# Patient Record
Sex: MALE | Race: OTHER
[De-identification: names, ages, dates, MRNs, and addresses within clinical notes are randomized per-mention and may not be internally consistent; named-entity substitution may affect disease eponyms.]

---
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
## 2017-05-28 NOTE — PN
[facility] CIWA





- CIWA Score


Nausea/Vomitin-No Nausea/No Vomiting


Muscle Tremors: 4-Moderate,w/Arms Extend


Anxiety: 3


Agitation: 3


Paroxysmal Sweats: 3


Orientation: 0-Oriented


Tacttile Disturbances: 0-None


Auditory Disturbances: 0-None


Visual Disturbances: 0-None


Headache: 0-None Present


CIWA-Ar Total Score: 13





[facility] COWS





- Scale


Resting Pulse: 0= [place] 80 or Below


Sweatin=Flushed/Facial Moisture


Restless Observation: 1= Difficult to Sit Still


Pupil Size: 0= Normal to Room Light


Bone or Joint Aches: 2= Severe Diffuse Aches


Runny Nose/ Eye Tearin= Runny Nose/Eyes


GI Upset > 30mins: 2= Nausea/Diarrhea


Tremor Observation of Outstretched Hands: 2= Slight Tremor Visible


Yawning Observation: 1= 1-2x During Session


Anxiety or Irritability: 2=Irritable/Anxious


Goose Flesh Skin: 0=Smooth Skin


COWS Score: 14





[facility] Progress Note (SOAP)


Subjective: 


Anxiety,tremors,sweating,interrupted sleep,restless,muscle aches.


Objective: 





17 15:32


 Vital Signs - 8 hr











  17





  11:12 13:41


 


Temperature 98.2 F 98.1 F


 


Pulse Rate 81 70


 


Respiratory 20 18





Rate  


 


Blood Pressure 118/72 106/69








 Laboratory Last Values











Urine Color  Ltyellow   17  17:34    


 


Urine Appearance  Clear   17  17:34    


 


Urine pH  6.0  (5.0-8.0)   17  17:34    


 


Ur Specific Gravity  1.015  (1.005-1.025)   17  17:34    


 


Urine Protein  Negative  (NEGATIVE)   17  17:34    


 


Urine Glucose (UA)  Negative  (NEGATIVE)   17  17:34    


 


Urine Ketones  Negative  (NEGATIVE)   17  17:34    


 


Urine Blood  Negative  (NEGATIVE)   17  17:34    


 


Urine Nitrite  Negative  (NEGATIVE)   17  17:34    


 


Urine Bilirubin  Negative  (NEGATIVE)   17  17:34    


 


Urine Urobilinogen  Negative E.U./dl (0.2-1.0)   17  17:34    


 


Ur Leukocyte Esterase  Negative  (NEGATIVE)   17  17:34    








labs noted


Assessment: 





17 15:33


Withdrawal sx.


Plan: 


Continue detox

## 2017-05-29 NOTE — PN
S CIWA





- CIWA Score


Nausea/Vomitin-Mild Nausea/No Vomiting


Muscle Tremors: 5


Anxiety: 4-Mod. Anxious/Guarded


Agitation: 3


Paroxysmal Sweats: 1-Minimal Palms Moist


Orientation: 0-Oriented


Tacttile Disturbances: 3-Moderate Itch/Numb/Burn


Auditory Disturbances: 0-None


Visual Disturbances: 2-Mild Sensitivity


Headache: 0-None Present


CIWA-Ar Total Score: 19





[facility] COWS





- Scale


Resting Pulse: 0= [place] 80 or Below


Sweatin=Flushed/Facial Moisture


Restless Observation: 1= Difficult to Sit Still


Pupil Size: 0= Normal to Room Light


Bone or Joint Aches: 2= Severe Diffuse Aches


Runny Nose/ Eye Tearin= Runny Nose/Eyes


GI Upset > 30mins: 1= Stomach Cramp


Tremor Observation of Outstretched Hands: 2= Slight Tremor Visible


Yawning Observation: 2= >3x During Session


Anxiety or Irritability: 2=Irritable/Anxious


Goose Flesh Skin: 0=Smooth Skin


COWS Score: 14





[facility] Progress Note (SOAP)


Subjective: 


Tremors, Anxious, Sweating.


Objective: 


PT. A & O X 3, OBSERVED AMBULATING ON UNIT. NO ACUTE DISTRESS.





17 15:12


 Vital Signs











Temperature  97.3 F L  17 10:12


 


Pulse Rate  76   17 10:12


 


Respiratory Rate  16   17 10:12


 


Blood Pressure  127/87   17 10:12


 


O2 Sat by Pulse Oximetry (%)      








 Laboratory Tests











  17





  17:34


 


Urine Color  Ltyellow


 


Urine Appearance  Clear


 


Urine pH  6.0


 


Ur Specific Gravity  1.015


 


Urine Protein  Negative


 


Urine Glucose (UA)  Negative


 


Urine Ketones  Negative


 


Urine Blood  Negative


 


Urine Nitrite  Negative


 


Urine Bilirubin  Negative


 


Urine Urobilinogen  Negative


 


Ur Leukocyte Esterase  Negative








LABS NOTED.





17 15:40





Assessment: 





17 15:12


WITHDRAWAL SYMPTOMS.


Plan: 


CONTINUE DETOX.


RE-ORDER ADMISSION LABS (PATIENT REFUSED INITIAL ATTEMPT AT LAB DRAW).

## 2017-05-29 NOTE — EKG
Test Reason : 

Blood Pressure : ***/*** mmHG

Vent. Rate : 066 BPM     Atrial Rate : 066 BPM

   P-R Int : 176 ms          QRS Dur : 112 ms

    QT Int : 442 ms       P-R-T Axes : 016 006 017 degrees

   QTc Int : 463 ms

 

NORMAL SINUS RHYTHM

INCOMPLETE RIGHT BUNDLE BRANCH BLOCK

MINIMAL VOLTAGE CRITERIA FOR LVH, MAY BE NORMAL VARIANT

BORDERLINE ECG

NO PREVIOUS ECGS AVAILABLE

Confirmed by BROOKS BRAVO MD ([id number]) on 5/29/2017 11:25:42 AM

 

Referred By:             Confirmed By:BROOKS BRAVO MD
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
## 2017-05-30 NOTE — PN
[facility] Progress Note (SOAP)


Subjective: 


INTERRUPTED SLEEP, SWEATS, WANTS TO WEAR OWN SNEAKERS AND TO SEE DIETICAN 


Objective: 





05/30/17 10:36


 Vital Signs











Temperature  97.9 F   05/30/17 09:40


 


Pulse Rate  82   05/30/17 09:40


 


Respiratory Rate  18   05/30/17 09:40


 


Blood Pressure  118/72   05/30/17 09:40


 


O2 Sat by Pulse Oximetry (%)      








 Laboratory Tests











  05/27/17





  17:34


 


Urine Color  Ltyellow


 


Urine Appearance  Clear


 


Urine pH  6.0


 


Ur Specific Gravity  1.015


 


Urine Protein  Negative


 


Urine Glucose (UA)  Negative


 


Urine Ketones  Negative


 


Urine Blood  Negative


 


Urine Nitrite  Negative


 


Urine Bilirubin  Negative


 


Urine Urobilinogen  Negative


 


Ur Leukocyte Esterase  Negative








PENDING LABS 





PT AOX3 IN  NAD AMBULATING 


Assessment: 





05/30/17 10:36


WIYTHDRAWAL SX;S 


Plan: 


CONT. DETOX 


INCREASE FLUIDS 


DIETICAN EVAL. 


MAY USE OWN SNEAKERS

## 2021-11-01 ENCOUNTER — HOSPITAL ENCOUNTER (INPATIENT)
Dept: HOSPITAL 74 - YASAS | Age: 46
LOS: 4 days | Discharge: OTHER INSTITUTION | DRG: 773 | End: 2021-11-05
Attending: ALLERGY & IMMUNOLOGY | Admitting: ALLERGY & IMMUNOLOGY
Payer: COMMERCIAL

## 2021-11-01 VITALS — BODY MASS INDEX: 37.5 KG/M2

## 2021-11-01 DIAGNOSIS — F41.9: ICD-10-CM
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
## 2021-11-01 DIAGNOSIS — F13.20: ICD-10-CM

## 2021-11-01 DIAGNOSIS — Z87.891: ICD-10-CM
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
## 2021-11-01 DIAGNOSIS — F11.20: ICD-10-CM

## 2021-11-01 DIAGNOSIS — F10.230: Primary | ICD-10-CM

## 2021-11-01 DIAGNOSIS — J45.909: ICD-10-CM

## 2021-11-01 PROCEDURE — U0003 INFECTIOUS AGENT DETECTION BY NUCLEIC ACID (DNA OR RNA); SEVERE ACUTE RESPIRATORY SYNDROME CORONAVIRUS 2 (SARS-COV-2) (CORONAVIRUS DISEASE [COVID-19]), AMPLIFIED PROBE TECHNIQUE, MAKING USE OF HIGH THROUGHPUT TECHNOLOGIES AS DESCRIBED BY CMS-2020-01-R: HCPCS

## 2021-11-01 PROCEDURE — U0005 INFEC AGEN DETEC AMPLI PROBE: HCPCS

## 2021-11-01 PROCEDURE — C9803 HOPD COVID-19 SPEC COLLECT: HCPCS

## 2021-11-02 LAB
ALBUMIN SERPL-MCNC: 3.4 G/DL (ref 3.4–5)
ALP SERPL-CCNC: 106 U/L (ref 45–117)
ALT SERPL-CCNC: 27 U/L (ref 13–61)
ANION GAP SERPL CALC-SCNC: 8 MMOL/L (ref 8–16)
AST SERPL-CCNC: 21 U/L (ref 15–37)
BILIRUB SERPL-MCNC: 0.5 MG/DL (ref 0.2–1)
BUN SERPL-MCNC: 21.3 MG/DL (ref 7–18)
CALCIUM SERPL-MCNC: 8.9 MG/DL (ref 8.5–10.1)
CHLORIDE SERPL-SCNC: 103 MMOL/L (ref 98–107)
CO2 SERPL-SCNC: 28 MMOL/L (ref 21–32)
CREAT SERPL-MCNC: 1.1 MG/DL (ref 0.55–1.3)
DEPRECATED RDW RBC AUTO: 14.4 % (ref 11.9–15.9)
GLUCOSE SERPL-MCNC: 96 MG/DL (ref 74–106)
HCT VFR BLD CALC: 40.3 % (ref 35.4–49)
HGB BLD-MCNC: 13.7 GM/DL (ref 11.7–16.9)
MCH RBC QN AUTO: 28.6 PG (ref 25.7–33.7)
MCHC RBC AUTO-ENTMCNC: 34 G/DL (ref 32–35.9)
MCV RBC: 84 FL (ref 80–96)
PLATELET # BLD AUTO: 380 10^3/UL (ref 134–434)
PMV BLD: 8.5 FL (ref 7.5–11.1)
PROT SERPL-MCNC: 7.8 G/DL (ref 6.4–8.2)
RBC # BLD AUTO: 4.8 M/MM3 (ref 4–5.6)
SODIUM SERPL-SCNC: 139 MMOL/L (ref 136–145)
WBC # BLD AUTO: 10.8 K/MM3 (ref 4–10)

## 2021-11-02 PROCEDURE — HZ2ZZZZ DETOXIFICATION SERVICES FOR SUBSTANCE ABUSE TREATMENT: ICD-10-PCS | Performed by: ALLERGY & IMMUNOLOGY

## 2021-11-02 RX ADMIN — NICOTINE POLACRILEX PRN MG: 4 GUM, CHEWING ORAL at 09:11

## 2021-11-02 RX ADMIN — Medication SCH: at 22:12

## 2021-11-02 RX ADMIN — NICOTINE POLACRILEX PRN MG: 4 GUM, CHEWING ORAL at 07:02

## 2021-11-02 RX ADMIN — NICOTINE POLACRILEX PRN MG: 4 GUM, CHEWING ORAL at 02:38

## 2021-11-02 RX ADMIN — NICOTINE POLACRILEX PRN MG: 4 GUM, CHEWING ORAL at 23:30

## 2021-11-02 RX ADMIN — METHOCARBAMOL PRN MG: 500 TABLET ORAL at 10:44

## 2021-11-02 RX ADMIN — Medication SCH MG: at 22:12

## 2021-11-02 RX ADMIN — NICOTINE POLACRILEX PRN MG: 4 GUM, CHEWING ORAL at 11:33

## 2021-11-02 RX ADMIN — NICOTINE POLACRILEX PRN MG: 4 GUM, CHEWING ORAL at 17:29

## 2021-11-02 RX ADMIN — Medication SCH TAB: at 10:44

## 2021-11-02 RX ADMIN — NICOTINE POLACRILEX PRN MG: 4 GUM, CHEWING ORAL at 21:03

## 2021-11-02 RX ADMIN — METHOCARBAMOL PRN MG: 500 TABLET ORAL at 22:12

## 2021-11-03 LAB
APPEARANCE UR: CLEAR
BILIRUB UR STRIP.AUTO-MCNC: NEGATIVE MG/DL
COLOR UR: YELLOW
KETONES UR QL STRIP: NEGATIVE
LEUKOCYTE ESTERASE UR QL STRIP.AUTO: NEGATIVE
NITRITE UR QL STRIP: NEGATIVE
PH UR: 7 [PH] (ref 5–8)
PROT UR QL STRIP: NEGATIVE
PROT UR QL STRIP: NEGATIVE
SP GR UR: 1.02 (ref 1.01–1.03)
UROBILINOGEN UR STRIP-MCNC: 1 MG/DL (ref 0.2–1)

## 2021-11-03 RX ADMIN — ANALGESIC BALM SCH: 1.74; 4.06 OINTMENT TOPICAL at 22:13

## 2021-11-03 RX ADMIN — NICOTINE POLACRILEX PRN MG: 4 GUM, CHEWING ORAL at 20:14

## 2021-11-03 RX ADMIN — IBUPROFEN PRN MG: 400 TABLET, FILM COATED ORAL at 20:13

## 2021-11-03 RX ADMIN — ANALGESIC BALM SCH APPLIC: 1.74; 4.06 OINTMENT TOPICAL at 13:25

## 2021-11-03 RX ADMIN — NICOTINE POLACRILEX PRN MG: 4 GUM, CHEWING ORAL at 17:44

## 2021-11-03 RX ADMIN — METHOCARBAMOL PRN MG: 500 TABLET ORAL at 13:22

## 2021-11-03 RX ADMIN — NICOTINE POLACRILEX PRN MG: 4 GUM, CHEWING ORAL at 14:48

## 2021-11-03 RX ADMIN — Medication SCH: at 22:17

## 2021-11-03 RX ADMIN — NICOTINE POLACRILEX PRN MG: 4 GUM, CHEWING ORAL at 07:14

## 2021-11-03 RX ADMIN — NICOTINE POLACRILEX PRN MG: 4 GUM, CHEWING ORAL at 22:15

## 2021-11-03 RX ADMIN — Medication SCH MG: at 22:15

## 2021-11-03 RX ADMIN — Medication SCH TAB: at 10:37

## 2021-11-04 RX ADMIN — NICOTINE POLACRILEX PRN MG: 4 GUM, CHEWING ORAL at 17:30

## 2021-11-04 RX ADMIN — METHOCARBAMOL PRN MG: 500 TABLET ORAL at 22:29

## 2021-11-04 RX ADMIN — ANALGESIC BALM SCH: 1.74; 4.06 OINTMENT TOPICAL at 23:16

## 2021-11-04 RX ADMIN — NICOTINE POLACRILEX PRN MG: 4 GUM, CHEWING ORAL at 06:36

## 2021-11-04 RX ADMIN — NICOTINE POLACRILEX PRN MG: 4 GUM, CHEWING ORAL at 08:54

## 2021-11-04 RX ADMIN — NICOTINE POLACRILEX PRN MG: 4 GUM, CHEWING ORAL at 22:30

## 2021-11-04 RX ADMIN — Medication SCH TAB: at 10:11

## 2021-11-04 RX ADMIN — Medication SCH MG: at 22:29

## 2021-11-04 RX ADMIN — ANALGESIC BALM SCH APPLIC: 1.74; 4.06 OINTMENT TOPICAL at 10:10

## 2021-11-04 RX ADMIN — IBUPROFEN PRN MG: 400 TABLET, FILM COATED ORAL at 22:29

## 2021-11-04 RX ADMIN — Medication SCH MG: at 22:30

## 2021-11-04 RX ADMIN — NICOTINE POLACRILEX PRN MG: 4 GUM, CHEWING ORAL at 12:00

## 2021-11-04 RX ADMIN — NICOTINE POLACRILEX PRN MG: 4 GUM, CHEWING ORAL at 19:48

## 2021-11-05 ENCOUNTER — HOSPITAL ENCOUNTER (INPATIENT)
Dept: HOSPITAL 74 - YASAS | Age: 46
LOS: 14 days | Discharge: HOME | DRG: 772 | End: 2021-11-19
Attending: ALLERGY & IMMUNOLOGY | Admitting: ALLERGY & IMMUNOLOGY
Payer: COMMERCIAL

## 2021-11-05 VITALS — HEART RATE: 81 BPM | SYSTOLIC BLOOD PRESSURE: 91 MMHG | DIASTOLIC BLOOD PRESSURE: 53 MMHG

## 2021-11-05 VITALS — TEMPERATURE: 97.3 F

## 2021-11-05 DIAGNOSIS — F13.20: ICD-10-CM

## 2021-11-05 DIAGNOSIS — F11.20: ICD-10-CM

## 2021-11-05 DIAGNOSIS — F17.210: ICD-10-CM

## 2021-11-05 DIAGNOSIS — F14.20: ICD-10-CM

## 2021-11-05 DIAGNOSIS — F10.20: Primary | ICD-10-CM

## 2021-11-05 DIAGNOSIS — L85.3: ICD-10-CM

## 2021-11-05 DIAGNOSIS — B35.6: ICD-10-CM

## 2021-11-05 PROCEDURE — HZ42ZZZ GROUP COUNSELING FOR SUBSTANCE ABUSE TREATMENT, COGNITIVE-BEHAVIORAL: ICD-10-PCS | Performed by: ALLERGY & IMMUNOLOGY

## 2021-11-05 RX ADMIN — NICOTINE POLACRILEX PRN MG: 4 GUM, CHEWING ORAL at 06:55

## 2021-11-05 RX ADMIN — NICOTINE POLACRILEX PRN MG: 4 GUM, CHEWING ORAL at 10:25

## 2021-11-05 RX ADMIN — NICOTINE POLACRILEX PRN MG: 4 GUM, CHEWING ORAL at 22:01

## 2021-11-05 RX ADMIN — ANALGESIC BALM SCH: 1.74; 4.06 OINTMENT TOPICAL at 10:25

## 2021-11-05 RX ADMIN — Medication SCH MG: at 21:59

## 2021-11-05 RX ADMIN — NICOTINE POLACRILEX PRN MG: 4 GUM, CHEWING ORAL at 16:20

## 2021-11-05 RX ADMIN — NICOTINE POLACRILEX PRN MG: 4 GUM, CHEWING ORAL at 19:14

## 2021-11-05 RX ADMIN — METHOCARBAMOL PRN MG: 500 TABLET ORAL at 10:24

## 2021-11-05 RX ADMIN — NICOTINE POLACRILEX PRN MG: 4 GUM, CHEWING ORAL at 14:00

## 2021-11-05 RX ADMIN — Medication SCH: at 10:25

## 2021-11-06 RX ADMIN — NICOTINE POLACRILEX PRN MG: 4 GUM, CHEWING ORAL at 17:58

## 2021-11-06 RX ADMIN — Medication SCH TAB: at 10:16

## 2021-11-06 RX ADMIN — Medication SCH MG: at 21:02

## 2021-11-06 RX ADMIN — NICOTINE POLACRILEX PRN MG: 4 GUM, CHEWING ORAL at 23:04

## 2021-11-06 RX ADMIN — NICOTINE POLACRILEX PRN MG: 4 GUM, CHEWING ORAL at 15:54

## 2021-11-06 RX ADMIN — NICOTINE POLACRILEX PRN MG: 4 GUM, CHEWING ORAL at 21:03

## 2021-11-06 RX ADMIN — NICOTINE POLACRILEX PRN MG: 4 GUM, CHEWING ORAL at 06:17

## 2021-11-06 RX ADMIN — NICOTINE POLACRILEX PRN MG: 4 GUM, CHEWING ORAL at 10:17

## 2021-11-07 RX ADMIN — NICOTINE POLACRILEX PRN MG: 4 GUM, CHEWING ORAL at 06:15

## 2021-11-07 RX ADMIN — NICOTINE POLACRILEX PRN MG: 4 GUM, CHEWING ORAL at 08:37

## 2021-11-07 RX ADMIN — NICOTINE POLACRILEX PRN MG: 4 GUM, CHEWING ORAL at 15:21

## 2021-11-07 RX ADMIN — NICOTINE POLACRILEX PRN MG: 4 GUM, CHEWING ORAL at 17:43

## 2021-11-07 RX ADMIN — Medication SCH TAB: at 09:55

## 2021-11-07 RX ADMIN — NICOTINE POLACRILEX PRN MG: 4 GUM, CHEWING ORAL at 21:43

## 2021-11-07 RX ADMIN — NICOTINE POLACRILEX PRN MG: 4 GUM, CHEWING ORAL at 12:43

## 2021-11-07 RX ADMIN — Medication SCH MG: at 21:43

## 2021-11-07 RX ADMIN — Medication SCH MG: at 21:42

## 2021-11-08 RX ADMIN — NICOTINE POLACRILEX PRN MG: 4 GUM, CHEWING ORAL at 21:03

## 2021-11-08 RX ADMIN — Medication SCH TAB: at 09:55

## 2021-11-08 RX ADMIN — Medication SCH MG: at 21:00

## 2021-11-08 RX ADMIN — NICOTINE POLACRILEX PRN MG: 4 GUM, CHEWING ORAL at 13:40

## 2021-11-08 RX ADMIN — NICOTINE POLACRILEX PRN MG: 4 GUM, CHEWING ORAL at 08:43

## 2021-11-08 RX ADMIN — NICOTINE POLACRILEX PRN MG: 4 GUM, CHEWING ORAL at 06:08

## 2021-11-08 RX ADMIN — NICOTINE POLACRILEX PRN MG: 4 GUM, CHEWING ORAL at 17:49

## 2021-11-08 RX ADMIN — NICOTINE POLACRILEX PRN MG: 4 GUM, CHEWING ORAL at 23:11

## 2021-11-08 RX ADMIN — Medication SCH APPLIC: at 11:45

## 2021-11-08 RX ADMIN — Medication SCH APPLIC: at 21:01

## 2021-11-08 RX ADMIN — Medication SCH MG: at 21:01

## 2021-11-09 RX ADMIN — NICOTINE POLACRILEX PRN MG: 4 GUM, CHEWING ORAL at 14:49

## 2021-11-09 RX ADMIN — Medication SCH MG: at 21:31

## 2021-11-09 RX ADMIN — Medication SCH APPLIC: at 10:17

## 2021-11-09 RX ADMIN — NICOTINE POLACRILEX PRN MG: 4 GUM, CHEWING ORAL at 09:16

## 2021-11-09 RX ADMIN — Medication SCH: at 21:32

## 2021-11-09 RX ADMIN — NICOTINE POLACRILEX PRN MG: 4 GUM, CHEWING ORAL at 12:49

## 2021-11-09 RX ADMIN — NICOTINE POLACRILEX PRN MG: 4 GUM, CHEWING ORAL at 06:34

## 2021-11-09 RX ADMIN — NICOTINE POLACRILEX PRN MG: 4 GUM, CHEWING ORAL at 19:41

## 2021-11-09 RX ADMIN — NICOTINE POLACRILEX PRN MG: 4 GUM, CHEWING ORAL at 21:42

## 2021-11-09 RX ADMIN — NICOTINE POLACRILEX PRN MG: 4 GUM, CHEWING ORAL at 17:32

## 2021-11-09 RX ADMIN — Medication SCH TAB: at 10:16

## 2021-11-10 RX ADMIN — NICOTINE POLACRILEX PRN MG: 4 GUM, CHEWING ORAL at 22:18

## 2021-11-10 RX ADMIN — Medication SCH MG: at 21:01

## 2021-11-10 RX ADMIN — Medication SCH APPLIC: at 21:02

## 2021-11-10 RX ADMIN — NICOTINE POLACRILEX PRN MG: 4 GUM, CHEWING ORAL at 05:54

## 2021-11-10 RX ADMIN — TOLNAFTATE SCH: 10 CREAM TOPICAL at 21:03

## 2021-11-10 RX ADMIN — NICOTINE POLACRILEX PRN MG: 4 GUM, CHEWING ORAL at 10:03

## 2021-11-10 RX ADMIN — NICOTINE POLACRILEX PRN MG: 4 GUM, CHEWING ORAL at 12:08

## 2021-11-10 RX ADMIN — Medication SCH APPLIC: at 10:03

## 2021-11-10 RX ADMIN — NICOTINE POLACRILEX PRN MG: 4 GUM, CHEWING ORAL at 17:20

## 2021-11-10 RX ADMIN — Medication SCH TAB: at 10:03

## 2021-11-10 RX ADMIN — NICOTINE POLACRILEX PRN MG: 4 GUM, CHEWING ORAL at 15:12

## 2021-11-10 RX ADMIN — NICOTINE POLACRILEX PRN MG: 4 GUM, CHEWING ORAL at 19:39

## 2021-11-11 RX ADMIN — NICOTINE POLACRILEX PRN MG: 4 GUM, CHEWING ORAL at 09:33

## 2021-11-11 RX ADMIN — Medication SCH APPLIC: at 09:34

## 2021-11-11 RX ADMIN — NICOTINE POLACRILEX PRN MG: 4 GUM, CHEWING ORAL at 20:56

## 2021-11-11 RX ADMIN — TOLNAFTATE SCH APPLIC: 10 CREAM TOPICAL at 22:29

## 2021-11-11 RX ADMIN — Medication SCH MG: at 22:29

## 2021-11-11 RX ADMIN — NICOTINE POLACRILEX PRN MG: 4 GUM, CHEWING ORAL at 12:48

## 2021-11-11 RX ADMIN — Medication SCH MG: at 22:27

## 2021-11-11 RX ADMIN — TOLNAFTATE SCH: 10 CREAM TOPICAL at 10:59

## 2021-11-11 RX ADMIN — Medication SCH TAB: at 09:32

## 2021-11-11 RX ADMIN — NICOTINE POLACRILEX PRN MG: 4 GUM, CHEWING ORAL at 05:55

## 2021-11-11 RX ADMIN — NICOTINE POLACRILEX PRN MG: 4 GUM, CHEWING ORAL at 17:09

## 2021-11-11 RX ADMIN — Medication SCH APPLIC: at 22:29

## 2021-11-12 RX ADMIN — NICOTINE POLACRILEX PRN MG: 4 GUM, CHEWING ORAL at 15:12

## 2021-11-12 RX ADMIN — Medication SCH MG: at 23:13

## 2021-11-12 RX ADMIN — CLOTRIMAZOLE SCH APPLIC: 1 CREAM TOPICAL at 12:06

## 2021-11-12 RX ADMIN — NICOTINE POLACRILEX PRN MG: 4 GUM, CHEWING ORAL at 21:03

## 2021-11-12 RX ADMIN — TOLNAFTATE SCH APPLIC: 10 CREAM TOPICAL at 23:13

## 2021-11-12 RX ADMIN — NICOTINE POLACRILEX PRN MG: 4 GUM, CHEWING ORAL at 17:21

## 2021-11-12 RX ADMIN — NICOTINE POLACRILEX PRN MG: 4 GUM, CHEWING ORAL at 10:22

## 2021-11-12 RX ADMIN — CARBAMIDE PEROXIDE SCH DROP: 6.5 SOLUTION/ DROPS TOPICAL at 23:13

## 2021-11-12 RX ADMIN — NICOTINE POLACRILEX PRN MG: 4 GUM, CHEWING ORAL at 06:05

## 2021-11-12 RX ADMIN — Medication SCH TAB: at 10:22

## 2021-11-12 RX ADMIN — Medication SCH APPLIC: at 10:22

## 2021-11-12 RX ADMIN — TOLNAFTATE SCH APPLIC: 10 CREAM TOPICAL at 10:22

## 2021-11-12 RX ADMIN — CLOTRIMAZOLE SCH APPLIC: 1 CREAM TOPICAL at 21:02

## 2021-11-12 RX ADMIN — Medication SCH: at 23:13

## 2021-11-12 RX ADMIN — CARBAMIDE PEROXIDE SCH DROP: 6.5 SOLUTION/ DROPS TOPICAL at 12:06

## 2021-11-12 RX ADMIN — NICOTINE POLACRILEX PRN MG: 4 GUM, CHEWING ORAL at 08:26

## 2021-11-13 RX ADMIN — NICOTINE POLACRILEX PRN MG: 4 GUM, CHEWING ORAL at 10:09

## 2021-11-13 RX ADMIN — NICOTINE POLACRILEX PRN MG: 4 GUM, CHEWING ORAL at 21:34

## 2021-11-13 RX ADMIN — CARBAMIDE PEROXIDE SCH DROP: 6.5 SOLUTION/ DROPS TOPICAL at 21:34

## 2021-11-13 RX ADMIN — CLOTRIMAZOLE SCH APPLIC: 1 CREAM TOPICAL at 10:07

## 2021-11-13 RX ADMIN — Medication SCH APPLIC: at 21:35

## 2021-11-13 RX ADMIN — CARBAMIDE PEROXIDE SCH DROP: 6.5 SOLUTION/ DROPS TOPICAL at 10:07

## 2021-11-13 RX ADMIN — NICOTINE POLACRILEX PRN MG: 4 GUM, CHEWING ORAL at 12:45

## 2021-11-13 RX ADMIN — TOLNAFTATE SCH: 10 CREAM TOPICAL at 10:08

## 2021-11-13 RX ADMIN — TOLNAFTATE SCH: 10 CREAM TOPICAL at 21:36

## 2021-11-13 RX ADMIN — CLOTRIMAZOLE SCH APPLIC: 1 CREAM TOPICAL at 21:36

## 2021-11-13 RX ADMIN — Medication SCH MG: at 21:33

## 2021-11-13 RX ADMIN — NICOTINE POLACRILEX PRN MG: 4 GUM, CHEWING ORAL at 18:21

## 2021-11-13 RX ADMIN — Medication SCH TAB: at 10:07

## 2021-11-13 RX ADMIN — NICOTINE POLACRILEX PRN MG: 4 GUM, CHEWING ORAL at 14:56

## 2021-11-13 RX ADMIN — NICOTINE POLACRILEX PRN MG: 4 GUM, CHEWING ORAL at 06:39

## 2021-11-13 RX ADMIN — Medication SCH: at 10:07

## 2021-11-14 RX ADMIN — NICOTINE POLACRILEX PRN MG: 4 GUM, CHEWING ORAL at 10:17

## 2021-11-14 RX ADMIN — TOLNAFTATE SCH: 10 CREAM TOPICAL at 21:04

## 2021-11-14 RX ADMIN — Medication SCH MG: at 21:04

## 2021-11-14 RX ADMIN — CLOTRIMAZOLE SCH: 1 CREAM TOPICAL at 21:04

## 2021-11-14 RX ADMIN — CLOTRIMAZOLE SCH APPLIC: 1 CREAM TOPICAL at 10:15

## 2021-11-14 RX ADMIN — Medication SCH TAB: at 10:15

## 2021-11-14 RX ADMIN — NICOTINE POLACRILEX PRN MG: 4 GUM, CHEWING ORAL at 21:03

## 2021-11-14 RX ADMIN — CARBAMIDE PEROXIDE SCH: 6.5 SOLUTION/ DROPS TOPICAL at 21:04

## 2021-11-14 RX ADMIN — Medication SCH: at 21:04

## 2021-11-14 RX ADMIN — NICOTINE POLACRILEX PRN MG: 4 GUM, CHEWING ORAL at 06:09

## 2021-11-14 RX ADMIN — NICOTINE POLACRILEX PRN MG: 4 GUM, CHEWING ORAL at 13:27

## 2021-11-14 RX ADMIN — NICOTINE POLACRILEX PRN MG: 4 GUM, CHEWING ORAL at 23:19

## 2021-11-14 RX ADMIN — CARBAMIDE PEROXIDE SCH DROP: 6.5 SOLUTION/ DROPS TOPICAL at 10:15

## 2021-11-14 RX ADMIN — TOLNAFTATE SCH: 10 CREAM TOPICAL at 11:14

## 2021-11-14 RX ADMIN — NICOTINE POLACRILEX PRN MG: 4 GUM, CHEWING ORAL at 17:34

## 2021-11-14 RX ADMIN — Medication SCH: at 10:16

## 2021-11-15 RX ADMIN — Medication SCH APPLIC: at 10:05

## 2021-11-15 RX ADMIN — NICOTINE POLACRILEX PRN MG: 4 GUM, CHEWING ORAL at 15:13

## 2021-11-15 RX ADMIN — NICOTINE POLACRILEX PRN MG: 4 GUM, CHEWING ORAL at 10:08

## 2021-11-15 RX ADMIN — CLOTRIMAZOLE SCH APPLIC: 1 CREAM TOPICAL at 10:04

## 2021-11-15 RX ADMIN — Medication SCH TAB: at 10:02

## 2021-11-15 RX ADMIN — NICOTINE POLACRILEX PRN MG: 4 GUM, CHEWING ORAL at 06:36

## 2021-11-15 RX ADMIN — Medication SCH MG: at 23:05

## 2021-11-15 RX ADMIN — NICOTINE POLACRILEX PRN MG: 4 GUM, CHEWING ORAL at 21:39

## 2021-11-15 RX ADMIN — CARBAMIDE PEROXIDE SCH DROP: 6.5 SOLUTION/ DROPS TOPICAL at 10:07

## 2021-11-15 RX ADMIN — CARBAMIDE PEROXIDE SCH DROP: 6.5 SOLUTION/ DROPS TOPICAL at 21:39

## 2021-11-15 RX ADMIN — Medication SCH MG: at 23:06

## 2021-11-15 RX ADMIN — NICOTINE POLACRILEX PRN MG: 4 GUM, CHEWING ORAL at 19:13

## 2021-11-15 RX ADMIN — TOLNAFTATE SCH APPLIC: 10 CREAM TOPICAL at 10:04

## 2021-11-15 RX ADMIN — CLOTRIMAZOLE SCH: 1 CREAM TOPICAL at 23:05

## 2021-11-15 RX ADMIN — Medication SCH: at 23:05

## 2021-11-15 RX ADMIN — TOLNAFTATE SCH: 10 CREAM TOPICAL at 23:05

## 2021-11-16 RX ADMIN — NICOTINE POLACRILEX PRN MG: 4 GUM, CHEWING ORAL at 17:45

## 2021-11-16 RX ADMIN — Medication SCH MG: at 21:00

## 2021-11-16 RX ADMIN — Medication SCH: at 21:01

## 2021-11-16 RX ADMIN — Medication SCH TAB: at 10:32

## 2021-11-16 RX ADMIN — TOLNAFTATE SCH: 10 CREAM TOPICAL at 10:33

## 2021-11-16 RX ADMIN — CLOTRIMAZOLE SCH APPLIC: 1 CREAM TOPICAL at 10:32

## 2021-11-16 RX ADMIN — MAGNESIUM HYDROXIDE PRN ML: 400 SUSPENSION ORAL at 06:06

## 2021-11-16 RX ADMIN — TOLNAFTATE SCH: 10 CREAM TOPICAL at 21:01

## 2021-11-16 RX ADMIN — CARBAMIDE PEROXIDE SCH: 6.5 SOLUTION/ DROPS TOPICAL at 21:01

## 2021-11-16 RX ADMIN — NICOTINE POLACRILEX PRN MG: 4 GUM, CHEWING ORAL at 06:05

## 2021-11-16 RX ADMIN — CARBAMIDE PEROXIDE SCH DROP: 6.5 SOLUTION/ DROPS TOPICAL at 10:33

## 2021-11-16 RX ADMIN — CLOTRIMAZOLE SCH: 1 CREAM TOPICAL at 21:01

## 2021-11-16 RX ADMIN — NICOTINE POLACRILEX PRN MG: 4 GUM, CHEWING ORAL at 19:45

## 2021-11-16 RX ADMIN — NICOTINE POLACRILEX PRN MG: 4 GUM, CHEWING ORAL at 10:33

## 2021-11-16 RX ADMIN — Medication SCH APPLIC: at 10:32

## 2021-11-16 RX ADMIN — NICOTINE POLACRILEX PRN MG: 4 GUM, CHEWING ORAL at 13:21

## 2021-11-17 RX ADMIN — Medication SCH APPLIC: at 11:02

## 2021-11-17 RX ADMIN — NICOTINE POLACRILEX PRN MG: 4 GUM, CHEWING ORAL at 06:47

## 2021-11-17 RX ADMIN — NICOTINE POLACRILEX PRN MG: 4 GUM, CHEWING ORAL at 21:29

## 2021-11-17 RX ADMIN — CARBAMIDE PEROXIDE SCH DROP: 6.5 SOLUTION/ DROPS TOPICAL at 11:03

## 2021-11-17 RX ADMIN — TOLNAFTATE SCH APPLIC: 10 CREAM TOPICAL at 11:08

## 2021-11-17 RX ADMIN — TOLNAFTATE SCH: 10 CREAM TOPICAL at 21:30

## 2021-11-17 RX ADMIN — TOLNAFTATE SCH: 10 CREAM TOPICAL at 11:03

## 2021-11-17 RX ADMIN — CARBAMIDE PEROXIDE SCH: 6.5 SOLUTION/ DROPS TOPICAL at 21:30

## 2021-11-17 RX ADMIN — CLOTRIMAZOLE SCH: 1 CREAM TOPICAL at 21:30

## 2021-11-17 RX ADMIN — NICOTINE POLACRILEX PRN MG: 4 GUM, CHEWING ORAL at 11:03

## 2021-11-17 RX ADMIN — NICOTINE POLACRILEX PRN MG: 4 GUM, CHEWING ORAL at 15:35

## 2021-11-17 RX ADMIN — Medication SCH: at 21:30

## 2021-11-17 RX ADMIN — CLOTRIMAZOLE SCH APPLIC: 1 CREAM TOPICAL at 11:02

## 2021-11-17 RX ADMIN — Medication SCH MG: at 21:29

## 2021-11-17 RX ADMIN — NICOTINE POLACRILEX PRN MG: 4 GUM, CHEWING ORAL at 13:10

## 2021-11-17 RX ADMIN — Medication SCH TAB: at 11:02

## 2021-11-18 VITALS — TEMPERATURE: 98.1 F

## 2021-11-18 RX ADMIN — Medication SCH: at 21:01

## 2021-11-18 RX ADMIN — Medication SCH TAB: at 10:01

## 2021-11-18 RX ADMIN — CARBAMIDE PEROXIDE SCH: 6.5 SOLUTION/ DROPS TOPICAL at 21:02

## 2021-11-18 RX ADMIN — NICOTINE POLACRILEX PRN MG: 4 GUM, CHEWING ORAL at 18:58

## 2021-11-18 RX ADMIN — NICOTINE POLACRILEX PRN MG: 4 GUM, CHEWING ORAL at 06:31

## 2021-11-18 RX ADMIN — Medication SCH MG: at 21:00

## 2021-11-18 RX ADMIN — CARBAMIDE PEROXIDE SCH DROP: 6.5 SOLUTION/ DROPS TOPICAL at 10:02

## 2021-11-18 RX ADMIN — TOLNAFTATE SCH APPLIC: 10 CREAM TOPICAL at 10:02

## 2021-11-18 RX ADMIN — CLOTRIMAZOLE SCH: 1 CREAM TOPICAL at 21:02

## 2021-11-18 RX ADMIN — MAGNESIUM HYDROXIDE PRN ML: 400 SUSPENSION ORAL at 22:17

## 2021-11-18 RX ADMIN — NICOTINE POLACRILEX PRN MG: 4 GUM, CHEWING ORAL at 10:02

## 2021-11-18 RX ADMIN — Medication SCH: at 10:02

## 2021-11-18 RX ADMIN — TOLNAFTATE SCH: 10 CREAM TOPICAL at 21:02

## 2021-11-18 RX ADMIN — CLOTRIMAZOLE SCH APPLIC: 1 CREAM TOPICAL at 10:02

## 2021-11-18 RX ADMIN — NICOTINE POLACRILEX PRN MG: 4 GUM, CHEWING ORAL at 14:43

## 2021-11-18 RX ADMIN — Medication SCH: at 21:02

## 2021-11-19 VITALS — DIASTOLIC BLOOD PRESSURE: 86 MMHG | SYSTOLIC BLOOD PRESSURE: 125 MMHG | HEART RATE: 56 BPM

## 2021-11-19 RX ADMIN — Medication SCH TAB: at 09:32

## 2021-11-19 RX ADMIN — Medication SCH: at 09:32

## 2021-11-19 RX ADMIN — CLOTRIMAZOLE SCH: 1 CREAM TOPICAL at 09:32

## 2021-11-19 RX ADMIN — TOLNAFTATE SCH: 10 CREAM TOPICAL at 09:33

## 2021-11-19 RX ADMIN — NICOTINE POLACRILEX PRN MG: 4 GUM, CHEWING ORAL at 05:43

## 2021-11-19 RX ADMIN — CARBAMIDE PEROXIDE SCH: 6.5 SOLUTION/ DROPS TOPICAL at 09:32

## 2023-01-21 ENCOUNTER — HOSPITAL ENCOUNTER (INPATIENT)
Dept: HOSPITAL 74 - YASAS | Age: 48
LOS: 4 days | Discharge: OTHER INSTITUTION | DRG: 773 | End: 2023-01-25
Attending: FAMILY MEDICINE | Admitting: ALLERGY & IMMUNOLOGY
Payer: COMMERCIAL

## 2023-01-21 VITALS — BODY MASS INDEX: 36.8 KG/M2

## 2023-01-21 DIAGNOSIS — F11.20: ICD-10-CM

## 2023-01-21 DIAGNOSIS — F19.282: ICD-10-CM

## 2023-01-21 DIAGNOSIS — Z86.69: ICD-10-CM

## 2023-01-21 DIAGNOSIS — F60.9: ICD-10-CM

## 2023-01-21 DIAGNOSIS — R94.31: ICD-10-CM

## 2023-01-21 DIAGNOSIS — Z87.891: ICD-10-CM

## 2023-01-21 DIAGNOSIS — F13.230: Primary | ICD-10-CM

## 2023-01-21 DIAGNOSIS — Z62.810: ICD-10-CM

## 2023-01-21 DIAGNOSIS — Z86.19: ICD-10-CM

## 2023-01-21 DIAGNOSIS — F25.9: ICD-10-CM

## 2023-01-21 DIAGNOSIS — R63.4: ICD-10-CM

## 2023-01-21 DIAGNOSIS — F41.9: ICD-10-CM

## 2023-01-21 DIAGNOSIS — F14.20: ICD-10-CM

## 2023-01-21 DIAGNOSIS — F19.24: ICD-10-CM

## 2023-01-21 DIAGNOSIS — J45.909: ICD-10-CM

## 2023-01-21 PROCEDURE — U0005 INFEC AGEN DETEC AMPLI PROBE: HCPCS

## 2023-01-21 PROCEDURE — U0003 INFECTIOUS AGENT DETECTION BY NUCLEIC ACID (DNA OR RNA); SEVERE ACUTE RESPIRATORY SYNDROME CORONAVIRUS 2 (SARS-COV-2) (CORONAVIRUS DISEASE [COVID-19]), AMPLIFIED PROBE TECHNIQUE, MAKING USE OF HIGH THROUGHPUT TECHNOLOGIES AS DESCRIBED BY CMS-2020-01-R: HCPCS

## 2023-01-21 PROCEDURE — HZ2ZZZZ DETOXIFICATION SERVICES FOR SUBSTANCE ABUSE TREATMENT: ICD-10-PCS | Performed by: FAMILY MEDICINE

## 2023-01-21 RX ADMIN — Medication SCH MG: at 22:15

## 2023-01-22 LAB
ALBUMIN SERPL-MCNC: 3.9 G/DL (ref 3.4–5)
ALP SERPL-CCNC: 75 U/L (ref 45–117)
ALT SERPL-CCNC: 26 U/L (ref 13–61)
ANION GAP SERPL CALC-SCNC: 9 MMOL/L (ref 8–16)
AST SERPL-CCNC: 22 U/L (ref 15–37)
BILIRUB SERPL-MCNC: 0.6 MG/DL (ref 0.2–1)
BUN SERPL-MCNC: 17.9 MG/DL (ref 7–18)
CALCIUM SERPL-MCNC: 8.9 MG/DL (ref 8.5–10.1)
CHLORIDE SERPL-SCNC: 105 MMOL/L (ref 98–107)
CO2 SERPL-SCNC: 29 MMOL/L (ref 21–32)
CREAT SERPL-MCNC: 0.7 MG/DL (ref 0.55–1.3)
DEPRECATED RDW RBC AUTO: 13.9 % (ref 11.9–15.9)
GLUCOSE SERPL-MCNC: 98 MG/DL (ref 74–106)
HCT VFR BLD CALC: 41.6 % (ref 35.4–49)
HGB BLD-MCNC: 13.7 GM/DL (ref 11.7–16.9)
MCH RBC QN AUTO: 28.3 PG (ref 25.7–33.7)
MCHC RBC AUTO-ENTMCNC: 32.9 G/DL (ref 32–35.9)
MCV RBC: 86 FL (ref 80–96)
PLATELET # BLD AUTO: 359 10^3/UL (ref 134–434)
PMV BLD: 8.7 FL (ref 7.5–11.1)
PROT SERPL-MCNC: 8 G/DL (ref 6.4–8.2)
RBC # BLD AUTO: 4.84 M/MM3 (ref 4–5.6)
SODIUM SERPL-SCNC: 142 MMOL/L (ref 136–145)
WBC # BLD AUTO: 8.1 K/MM3 (ref 4–10)

## 2023-01-22 RX ADMIN — NICOTINE POLACRILEX PRN MG: 2 GUM, CHEWING ORAL at 16:40

## 2023-01-22 RX ADMIN — Medication SCH MG: at 22:25

## 2023-01-22 RX ADMIN — Medication SCH TAB: at 10:23

## 2023-01-22 RX ADMIN — NICOTINE POLACRILEX PRN MG: 2 GUM, CHEWING ORAL at 12:48

## 2023-01-22 RX ADMIN — NICOTINE POLACRILEX PRN MG: 2 GUM, CHEWING ORAL at 07:14

## 2023-01-22 RX ADMIN — METHOCARBAMOL PRN MG: 500 TABLET ORAL at 10:24

## 2023-01-22 RX ADMIN — NICOTINE POLACRILEX PRN MG: 2 GUM, CHEWING ORAL at 10:23

## 2023-01-23 RX ADMIN — NICOTINE POLACRILEX PRN MG: 2 GUM, CHEWING ORAL at 16:36

## 2023-01-23 RX ADMIN — Medication SCH MG: at 22:20

## 2023-01-23 RX ADMIN — NICOTINE POLACRILEX PRN MG: 2 GUM, CHEWING ORAL at 05:27

## 2023-01-23 RX ADMIN — NICOTINE POLACRILEX PRN MG: 2 GUM, CHEWING ORAL at 20:24

## 2023-01-23 RX ADMIN — Medication SCH MG: at 22:21

## 2023-01-23 RX ADMIN — Medication SCH TAB: at 09:51

## 2023-01-23 RX ADMIN — NICOTINE POLACRILEX PRN MG: 2 GUM, CHEWING ORAL at 09:56

## 2023-01-23 RX ADMIN — METHOCARBAMOL PRN MG: 500 TABLET ORAL at 13:55

## 2023-01-23 RX ADMIN — NICOTINE POLACRILEX PRN MG: 2 GUM, CHEWING ORAL at 12:51

## 2023-01-24 RX ADMIN — NICOTINE POLACRILEX PRN MG: 2 GUM, CHEWING ORAL at 13:34

## 2023-01-24 RX ADMIN — METHOCARBAMOL PRN MG: 500 TABLET ORAL at 10:06

## 2023-01-24 RX ADMIN — Medication SCH MG: at 22:01

## 2023-01-24 RX ADMIN — NICOTINE POLACRILEX PRN MG: 2 GUM, CHEWING ORAL at 08:25

## 2023-01-24 RX ADMIN — NICOTINE POLACRILEX PRN MG: 2 GUM, CHEWING ORAL at 22:01

## 2023-01-24 RX ADMIN — Medication SCH TAB: at 10:06

## 2023-01-24 RX ADMIN — NICOTINE POLACRILEX PRN MG: 2 GUM, CHEWING ORAL at 17:42

## 2023-01-24 RX ADMIN — Medication SCH MG: at 22:02

## 2023-01-24 RX ADMIN — NICOTINE POLACRILEX PRN MG: 2 GUM, CHEWING ORAL at 05:31

## 2023-01-25 ENCOUNTER — HOSPITAL ENCOUNTER (INPATIENT)
Dept: HOSPITAL 74 - YASAS | Age: 48
Discharge: LEFT BEFORE DISCHARGE | DRG: 770 | End: 2023-01-25
Attending: PSYCHIATRY & NEUROLOGY | Admitting: ALLERGY & IMMUNOLOGY
Payer: COMMERCIAL

## 2023-01-25 VITALS
HEART RATE: 100 BPM | RESPIRATION RATE: 16 BRPM | TEMPERATURE: 97.5 F | SYSTOLIC BLOOD PRESSURE: 127 MMHG | DIASTOLIC BLOOD PRESSURE: 87 MMHG

## 2023-01-25 VITALS
TEMPERATURE: 97.5 F | HEART RATE: 88 BPM | DIASTOLIC BLOOD PRESSURE: 76 MMHG | RESPIRATION RATE: 16 BRPM | SYSTOLIC BLOOD PRESSURE: 129 MMHG

## 2023-01-25 DIAGNOSIS — F25.9: ICD-10-CM

## 2023-01-25 DIAGNOSIS — F19.282: ICD-10-CM

## 2023-01-25 DIAGNOSIS — J45.909: ICD-10-CM

## 2023-01-25 DIAGNOSIS — F19.24: ICD-10-CM

## 2023-01-25 DIAGNOSIS — Z87.891: ICD-10-CM

## 2023-01-25 DIAGNOSIS — H91.93: ICD-10-CM

## 2023-01-25 DIAGNOSIS — Z91.199: ICD-10-CM

## 2023-01-25 DIAGNOSIS — F41.9: ICD-10-CM

## 2023-01-25 DIAGNOSIS — F13.20: Primary | ICD-10-CM

## 2023-01-25 DIAGNOSIS — F11.20: ICD-10-CM

## 2023-01-25 PROCEDURE — HZ42ZZZ GROUP COUNSELING FOR SUBSTANCE ABUSE TREATMENT, COGNITIVE-BEHAVIORAL: ICD-10-PCS | Performed by: PSYCHIATRY & NEUROLOGY

## 2023-01-25 RX ADMIN — NICOTINE POLACRILEX PRN MG: 2 GUM, CHEWING ORAL at 05:39

## 2023-01-25 RX ADMIN — Medication SCH TAB: at 09:45

## 2023-04-28 ENCOUNTER — HOSPITAL ENCOUNTER (INPATIENT)
Dept: HOSPITAL 74 - YASAS | Age: 48
LOS: 1 days | Discharge: ANOTHER HOSPITAL | DRG: 773 | End: 2023-04-29
Attending: SURGERY | Admitting: ALLERGY & IMMUNOLOGY
Payer: COMMERCIAL

## 2023-04-28 VITALS
DIASTOLIC BLOOD PRESSURE: 89 MMHG | SYSTOLIC BLOOD PRESSURE: 137 MMHG | HEART RATE: 76 BPM | TEMPERATURE: 98 F | RESPIRATION RATE: 18 BRPM

## 2023-04-28 VITALS — BODY MASS INDEX: 33 KG/M2

## 2023-04-28 DIAGNOSIS — F13.230: ICD-10-CM

## 2023-04-28 DIAGNOSIS — F11.20: Primary | ICD-10-CM

## 2023-04-28 DIAGNOSIS — R45.851: ICD-10-CM

## 2023-04-28 DIAGNOSIS — F17.210: ICD-10-CM

## 2023-04-28 DIAGNOSIS — Z86.59: ICD-10-CM

## 2023-04-28 PROCEDURE — HZ2ZZZZ DETOXIFICATION SERVICES FOR SUBSTANCE ABUSE TREATMENT: ICD-10-PCS | Performed by: SURGERY

## 2023-04-28 PROCEDURE — U0005 INFEC AGEN DETEC AMPLI PROBE: HCPCS

## 2023-04-28 PROCEDURE — U0003 INFECTIOUS AGENT DETECTION BY NUCLEIC ACID (DNA OR RNA); SEVERE ACUTE RESPIRATORY SYNDROME CORONAVIRUS 2 (SARS-COV-2) (CORONAVIRUS DISEASE [COVID-19]), AMPLIFIED PROBE TECHNIQUE, MAKING USE OF HIGH THROUGHPUT TECHNOLOGIES AS DESCRIBED BY CMS-2020-01-R: HCPCS

## 2023-04-28 RX ADMIN — NICOTINE POLACRILEX PRN MG: 2 GUM, CHEWING ORAL at 23:21

## 2023-04-28 RX ADMIN — NICOTINE POLACRILEX PRN MG: 2 GUM, CHEWING ORAL at 20:55

## 2024-08-14 ENCOUNTER — HOSPITAL ENCOUNTER (INPATIENT)
Dept: HOSPITAL 74 - YASAS | Age: 49
LOS: 5 days | Discharge: HOME | DRG: 773 | End: 2024-08-19
Attending: PSYCHIATRY & NEUROLOGY | Admitting: ALLERGY & IMMUNOLOGY
Payer: COMMERCIAL

## 2024-08-14 VITALS — BODY MASS INDEX: 36.3 KG/M2

## 2024-08-14 DIAGNOSIS — F10.230: Primary | ICD-10-CM

## 2024-08-14 DIAGNOSIS — F13.230: ICD-10-CM

## 2024-08-14 DIAGNOSIS — F17.210: ICD-10-CM

## 2024-08-14 DIAGNOSIS — F11.20: ICD-10-CM

## 2024-08-14 DIAGNOSIS — K59.00: ICD-10-CM

## 2024-08-14 DIAGNOSIS — Z91.013: ICD-10-CM

## 2024-08-14 DIAGNOSIS — J45.909: ICD-10-CM

## 2024-08-14 PROCEDURE — HZ2ZZZZ DETOXIFICATION SERVICES FOR SUBSTANCE ABUSE TREATMENT: ICD-10-PCS | Performed by: SURGERY

## 2024-08-14 RX ADMIN — Medication SCH MG: at 22:06

## 2024-08-14 RX ADMIN — TUBERCULIN PURIFIED PROTEIN DERIVATIVE ONE ML: 5 INJECTION, SOLUTION INTRADERMAL at 19:31

## 2024-08-14 RX ADMIN — NICOTINE POLACRILEX PRN MG: 2 GUM, CHEWING ORAL at 19:31

## 2024-08-14 RX ADMIN — METHOCARBAMOL PRN MG: 500 TABLET ORAL at 17:19

## 2024-08-14 RX ADMIN — Medication SCH MG: at 22:05

## 2024-08-15 LAB
ALBUMIN SERPL-MCNC: 3.8 G/DL (ref 3.4–5)
ALP SERPL-CCNC: 85 U/L (ref 45–117)
ALT SERPL-CCNC: 34 U/L (ref 13–61)
ANION GAP SERPL CALC-SCNC: 7 MMOL/L (ref 4–13)
AST SERPL-CCNC: 25 U/L (ref 15–37)
BILIRUB SERPL-MCNC: 0.6 MG/DL (ref 0.2–1)
BUN SERPL-MCNC: 17.9 MG/DL (ref 7–18)
CALCIUM SERPL-MCNC: 9.6 MG/DL (ref 8.5–10.1)
CHLORIDE SERPL-SCNC: 104 MMOL/L (ref 98–107)
CO2 SERPL-SCNC: 28 MMOL/L (ref 21–32)
CREAT SERPL-MCNC: 0.7 MG/DL (ref 0.55–1.3)
DEPRECATED RDW RBC AUTO: 13.8 % (ref 11.9–15.9)
GLUCOSE SERPL-MCNC: 103 MG/DL (ref 74–106)
HCT VFR BLD CALC: 40.3 % (ref 35.4–49)
HGB BLD-MCNC: 14.1 GM/DL (ref 11.7–16.9)
MCH RBC QN AUTO: 29.1 PG (ref 25.7–33.7)
MCHC RBC AUTO-ENTMCNC: 34.8 G/DL (ref 32–35.9)
MCV RBC: 83.5 FL (ref 80–96)
PLATELET # BLD AUTO: 378 10^3/UL (ref 134–434)
PMV BLD: 8.8 FL (ref 7.5–11.1)
POTASSIUM SERPLBLD-SCNC: 4.5 MMOL/L (ref 3.5–5.1)
PROT SERPL-MCNC: 7.8 G/DL (ref 6.4–8.2)
RBC # BLD AUTO: 4.83 M/MM3 (ref 4–5.6)
SODIUM SERPL-SCNC: 138 MMOL/L (ref 136–145)
WBC # BLD AUTO: 8.2 K/MM3 (ref 4–10)

## 2024-08-15 RX ADMIN — Medication SCH TAB: at 10:07

## 2024-08-15 RX ADMIN — MAGNESIUM HYDROXIDE PRN ML: 400 SUSPENSION ORAL at 10:09

## 2024-08-15 RX ADMIN — POLYETHYLENE GLYCOL 3350 PRN GM: 17 POWDER, FOR SOLUTION ORAL at 22:07

## 2024-08-16 RX ADMIN — NICOTINE POLACRILEX PRN MG: 2 LOZENGE ORAL at 11:02

## 2024-08-16 RX ADMIN — DOCUSATE SODIUM SCH MG: 100 CAPSULE, LIQUID FILLED ORAL at 22:32

## 2024-08-17 RX ADMIN — BISACODYL PRN MG: 10 SUPPOSITORY RECTAL at 22:50

## 2024-08-18 RX ADMIN — LACTULOSE ONE GM: 20 SOLUTION ORAL at 09:59

## 2024-08-19 VITALS
RESPIRATION RATE: 16 BRPM | DIASTOLIC BLOOD PRESSURE: 72 MMHG | SYSTOLIC BLOOD PRESSURE: 111 MMHG | TEMPERATURE: 97.3 F | HEART RATE: 62 BPM